# Patient Record
Sex: FEMALE | Race: BLACK OR AFRICAN AMERICAN | NOT HISPANIC OR LATINO | Employment: FULL TIME | URBAN - METROPOLITAN AREA
[De-identification: names, ages, dates, MRNs, and addresses within clinical notes are randomized per-mention and may not be internally consistent; named-entity substitution may affect disease eponyms.]

---

## 2019-11-08 ENCOUNTER — OFFICE VISIT (OUTPATIENT)
Dept: OBSTETRICS AND GYNECOLOGY | Facility: CLINIC | Age: 24
End: 2019-11-08
Payer: MEDICAID

## 2019-11-08 VITALS
DIASTOLIC BLOOD PRESSURE: 74 MMHG | HEIGHT: 67 IN | SYSTOLIC BLOOD PRESSURE: 122 MMHG | WEIGHT: 207.31 LBS | BODY MASS INDEX: 32.54 KG/M2

## 2019-11-08 DIAGNOSIS — Z30.9 ENCOUNTER FOR CONTRACEPTIVE MANAGEMENT, UNSPECIFIED TYPE: ICD-10-CM

## 2019-11-08 DIAGNOSIS — Z11.3 SCREENING EXAMINATION FOR STD (SEXUALLY TRANSMITTED DISEASE): ICD-10-CM

## 2019-11-08 DIAGNOSIS — N89.8 VAGINAL DISCHARGE: ICD-10-CM

## 2019-11-08 DIAGNOSIS — Z01.419 WELL WOMAN EXAM WITH ROUTINE GYNECOLOGICAL EXAM: Primary | ICD-10-CM

## 2019-11-08 DIAGNOSIS — Z72.0 TOBACCO ABUSE: ICD-10-CM

## 2019-11-08 LAB
B-HCG UR QL: NEGATIVE
CTP QC/QA: YES

## 2019-11-08 PROCEDURE — 99999 PR PBB SHADOW E&M-EST. PATIENT-LVL III: CPT | Mod: PBBFAC,,, | Performed by: OBSTETRICS & GYNECOLOGY

## 2019-11-08 PROCEDURE — 99212 OFFICE O/P EST SF 10 MIN: CPT | Mod: 25,S$PBB,, | Performed by: OBSTETRICS & GYNECOLOGY

## 2019-11-08 PROCEDURE — 87801 DETECT AGNT MULT DNA AMPLI: CPT

## 2019-11-08 PROCEDURE — 99385 PREV VISIT NEW AGE 18-39: CPT | Mod: S$PBB,,, | Performed by: OBSTETRICS & GYNECOLOGY

## 2019-11-08 PROCEDURE — 87481 CANDIDA DNA AMP PROBE: CPT | Mod: 59

## 2019-11-08 PROCEDURE — 99385 PR PREVENTIVE VISIT,NEW,18-39: ICD-10-PCS | Mod: S$PBB,,, | Performed by: OBSTETRICS & GYNECOLOGY

## 2019-11-08 PROCEDURE — 81025 URINE PREGNANCY TEST: CPT | Mod: PBBFAC,PN | Performed by: OBSTETRICS & GYNECOLOGY

## 2019-11-08 PROCEDURE — 87491 CHLMYD TRACH DNA AMP PROBE: CPT | Mod: 59

## 2019-11-08 PROCEDURE — 99212 PR OFFICE/OUTPT VISIT, EST, LEVL II, 10-19 MIN: ICD-10-PCS | Mod: 25,S$PBB,, | Performed by: OBSTETRICS & GYNECOLOGY

## 2019-11-08 PROCEDURE — 99213 OFFICE O/P EST LOW 20 MIN: CPT | Mod: PBBFAC,PN | Performed by: OBSTETRICS & GYNECOLOGY

## 2019-11-08 PROCEDURE — 99999 PR PBB SHADOW E&M-EST. PATIENT-LVL III: ICD-10-PCS | Mod: PBBFAC,,, | Performed by: OBSTETRICS & GYNECOLOGY

## 2019-11-08 RX ORDER — ACETAMINOPHEN AND CODEINE PHOSPHATE 120; 12 MG/5ML; MG/5ML
1 SOLUTION ORAL DAILY
Qty: 30 TABLET | Refills: 12 | Status: SHIPPED | OUTPATIENT
Start: 2019-11-08 | End: 2019-12-08

## 2019-11-08 NOTE — PROGRESS NOTES
History & Physical  Gynecology      SUBJECTIVE:     Chief Complaint: Gynecologic Exam; STD CHECK; and Contraception       History of Present Illness:  25 y/o  presents today for contraceptive management in addition to annual visit. She has been on depo provera and OCP's in the past. She is not interested in depo provera due to weight gain, she is currently not using any form of contraception with her male partner.        Review of patient's allergies indicates:  No Known Allergies    History reviewed. No pertinent past medical history.  Past Surgical History:   Procedure Laterality Date     SECTION       OB History        3    Para   3    Term                AB        Living           SAB        TAB        Ectopic        Multiple   1    Live Births                   Family History   Problem Relation Age of Onset    Breast cancer Neg Hx     Colon cancer Neg Hx     Ovarian cancer Neg Hx      Social History     Tobacco Use    Smoking status: Current Every Day Smoker   Substance Use Topics    Alcohol use: Yes    Drug use: Never       Current Outpatient Medications   Medication Sig    norethindrone (MICRONOR) 0.35 mg tablet Take 1 tablet (0.35 mg total) by mouth once daily.     No current facility-administered medications for this visit.          Review of Systems:  Review of Systems   Constitutional: Negative for appetite change, chills, diaphoresis, fatigue and fever.   Respiratory: Negative for cough and shortness of breath.    Cardiovascular: Negative for chest pain and palpitations.   Gastrointestinal: Negative.  Negative for abdominal pain, constipation, diarrhea, nausea and vomiting.   Genitourinary: Negative for decreased libido, dysuria, frequency, menstrual problem, pelvic pain, urgency, vaginal bleeding, vaginal discharge, vaginal pain and vaginal odor.        OBJECTIVE:     Physical Exam:  Physical Exam   Constitutional: She appears well-developed and well-nourished.   Neck:  "No tracheal deviation present. No thyromegaly present.   Cardiovascular: Normal rate and regular rhythm. Exam reveals no gallop and no friction rub.   No murmur heard.  Pulmonary/Chest: Effort normal and breath sounds normal. No stridor. No respiratory distress. She has no wheezes. She has no rales. She exhibits no tenderness.   Abdominal: Soft. Bowel sounds are normal.   Genitourinary: Uterus normal. Vaginal discharge found.   Lymphadenopathy:     She has no cervical adenopathy.         ASSESSMENT:       ICD-10-CM ICD-9-CM    1. Well woman exam with routine gynecological exam Z01.419 V72.31 Vaginosis Screen by DNA Probe      C. trachomatis/N. gonorrhoeae by AMP DNA      RPR      HIV 1/2 Ag/Ab (4th Gen)   2. Tobacco abuse Z72.0 305.1    3. Encounter for contraceptive management, unspecified type Z30.9 V25.9 norethindrone (MICRONOR) 0.35 mg tablet   4. Screening examination for STD (sexually transmitted disease) Z11.3 V74.5 POCT Urine Pregnancy      Vaginosis Screen by DNA Probe      C. trachomatis/N. gonorrhoeae by AMP DNA      RPR      HIV 1/2 Ag/Ab (4th Gen)   5. Vaginal discharge N89.8 623.5           Plan:      1. Encounter for contraceptive management, unspecified type  Education given regarding options for contraception, including barrier methods, injectable contraception, IUD placement, oral contraceptives. Patient states she is not interested in anything that is "placed inside the body", not a candidate for estrogen due to tobacco usage and increased clot risk. I emphasized important of taking progesterone-containing birth control at same time every day for maximum efficacy.    - norethindrone (MICRONOR) 0.35 mg tablet; Take 1 tablet (0.35 mg total) by mouth once daily.  Dispense: 30 tablet; Refill: 12             Counseling time: 30 minutes    UBALDO Du"

## 2019-11-08 NOTE — PROGRESS NOTES
SUBJECTIVE:   24 y.o. female   for annual routine Pap and checkup. Patient's last menstrual period was 10/30/2019..  PMHx significant for the morbidities listed below.  Denies bleeding. Reports intermittent malodorous vaginal discharge. Reports mild stress urinary incontinence.  Denies regular exercise. Reports tobacco usage, has quit before but not ready to quit yet, partner smokes. Is not taking calcium and vitamin D supplements. Is currently sexually active in monogamous relationship with male partner. Not currently using contraception (CLINIC UPT NEGATIVE) . Denies domestic violence.     OB history:  x2, C\S for twins  Fam hx: negative for ovarian, breast, uterine, colon    Pap 1 year ago, WNL  MMG not indicated  Colonoscopy not indicated           History reviewed. No pertinent past medical history.  Past Surgical History:   Procedure Laterality Date     SECTION       Social History     Socioeconomic History    Marital status: Significant Other     Spouse name: Not on file    Number of children: Not on file    Years of education: Not on file    Highest education level: Not on file   Occupational History    Not on file   Social Needs    Financial resource strain: Not on file    Food insecurity:     Worry: Not on file     Inability: Not on file    Transportation needs:     Medical: Not on file     Non-medical: Not on file   Tobacco Use    Smoking status: Current Every Day Smoker   Substance and Sexual Activity    Alcohol use: Yes    Drug use: Never    Sexual activity: Yes     Partners: Male     Birth control/protection: None   Lifestyle    Physical activity:     Days per week: Not on file     Minutes per session: Not on file    Stress: Not on file   Relationships    Social connections:     Talks on phone: Not on file     Gets together: Not on file     Attends Hoahaoism service: Not on file     Active member of club or organization: Not on file     Attends meetings of clubs or  organizations: Not on file     Relationship status: Not on file   Other Topics Concern    Not on file   Social History Narrative    Not on file     Family History   Problem Relation Age of Onset    Breast cancer Neg Hx     Colon cancer Neg Hx     Ovarian cancer Neg Hx      OB History    Para Term  AB Living   3 3           SAB TAB Ectopic Multiple Live Births         1        # Outcome Date GA Lbr Reinaldo/2nd Weight Sex Delivery Anes PTL Lv   3A Para      CS-Unspec      3B Para      CS-Unspec      2 Para      Vag-Spont      1 Para      Vag-Spont            Current Outpatient Medications   Medication Sig Dispense Refill    norethindrone (MICRONOR) 0.35 mg tablet Take 1 tablet (0.35 mg total) by mouth once daily. 30 tablet 12     No current facility-administered medications for this visit.      Allergies: Patient has no known allergies.     ROS:  Constitutional: no weight loss, weight gain, fever, fatigue  Eyes:  No vision changes, glasses/contacts  ENT/Mouth: No ulcers, sinus problems, ears ringing, headache  Cardiovascular: No inability to lie flat, chest pain, exercise intolerance, swelling, heart palpitations  Respiratory: No wheezing, coughing blood, shortness of breath, or cough  Gastrointestinal: No diarrhea, bloody stool, nausea/vomiting, constipation, gas, hemorrhoids  Genitourinary: No blood in urine, painful urination, urgency of urination, frequency of urination, incomplete emptying, incontinence, abnormal bleeding, painful periods, heavy periods, vaginal discharge, vaginal odor, painful intercourse, sexual problems, bleeding after intercourse.  Musculoskeletal: No muscle weakness  Skin/Breast: No painful breasts, nipple discharge, masses, rash, ulcers  Neurological: No passing out, seizures, numbness, headache  Endocrine: No diabetes, hypothyroid, hyperthyroid, hot flashes, hair loss, abnormal hair growth, ance  Psychiatric: No depression, crying  Hematologic: No bruises, bleeding, swollen  "lymph nodes, anemia.      OBJECTIVE:   The patient appears well, alert, oriented x 3, in no distress.  /74   Ht 5' 7" (1.702 m)   Wt 94 kg (207 lb 5.5 oz)   LMP 10/30/2019   BMI 32.47 kg/m²   NECK: negative, no thyromegaly, trachea midline  SKIN: normal and no acne, striae, hirsutism  BREAST EXAM: breasts appear normal, no suspicious masses, no skin or nipple changes or axillary nodes  ABDOMEN: soft, non-tender; bowel sounds normal; no masses,  no organomegaly and no hernias, masses, or hepatosplenomegaly  GENITALIA: normal external genitalia, no erythema, no discharge  URETHRA: normal appearing urethra with no masses, tenderness or lesions  VAGINA: vaginal discharge yellow and malodorous  CERVIX: no lesions or cervical motion tenderness  UTERUS: normal size, contour, position, consistency, mobility, non-tender  ADNEXA: unable to appreciate due to body habitus    \  ASSESSMENT:   SOLANGE was seen today for gynecologic exam, std check and contraception.    Diagnoses and all orders for this visit:    Well woman exam with routine gynecological exam  -     Vaginosis Screen by DNA Probe  -     C. trachomatis/N. gonorrhoeae by AMP DNA  -     RPR; Future  -     HIV 1/2 Ag/Ab (4th Gen); Future    Tobacco abuse  - counseled patient on health benefits of smoking cessation and risks of continued tobacco usage including, HPV infection, stroke, heart disease, lung cancer, and premature death.    Screening examination for STD (sexually transmitted disease)  -     POCT Urine Pregnancy  -     Vaginosis Screen by DNA Probe  -     C. trachomatis/N. gonorrhoeae by AMP DNA  -     RPR; Future  -     HIV 1/2 Ag/Ab (4th Gen); Future    Other orders  -     Cancel: Liquid-based pap smear, screening  -     Cancel: HPV High Risk Genotypes, PCR      "

## 2019-11-09 LAB
BACTERIAL VAGINOSIS DNA: POSITIVE
C TRACH DNA SPEC QL NAA+PROBE: NOT DETECTED
CANDIDA GLABRATA DNA: NEGATIVE
CANDIDA KRUSEI DNA: NEGATIVE
CANDIDA RRNA VAG QL PROBE: NEGATIVE
N GONORRHOEA DNA SPEC QL NAA+PROBE: NOT DETECTED
T VAGINALIS RRNA GENITAL QL PROBE: POSITIVE

## 2019-11-10 ENCOUNTER — PATIENT MESSAGE (OUTPATIENT)
Dept: OBSTETRICS AND GYNECOLOGY | Facility: CLINIC | Age: 24
End: 2019-11-10

## 2019-11-11 ENCOUNTER — TELEPHONE (OUTPATIENT)
Dept: OBSTETRICS AND GYNECOLOGY | Facility: CLINIC | Age: 24
End: 2019-11-11

## 2019-11-11 DIAGNOSIS — N76.0 BACTERIAL VAGINOSIS: Primary | ICD-10-CM

## 2019-11-11 DIAGNOSIS — B96.89 BACTERIAL VAGINOSIS: Primary | ICD-10-CM

## 2019-11-11 DIAGNOSIS — A59.01 TRICHOMONAS VAGINITIS: ICD-10-CM

## 2019-11-11 RX ORDER — METRONIDAZOLE 500 MG/1
500 TABLET ORAL 2 TIMES DAILY
Qty: 14 TABLET | Refills: 0 | Status: SHIPPED | OUTPATIENT
Start: 2019-11-11 | End: 2019-11-18

## 2019-11-11 NOTE — TELEPHONE ENCOUNTER
----- Message from GARRY Du MD sent at 11/11/2019  8:35 AM CST -----  Trichomonas positive, please notify patient, prescription has been sent.

## 2019-11-11 NOTE — TELEPHONE ENCOUNTER
Spoke with pt. Pt notified of results. Medication precaution given. Pt advised to notify all sexual partners in the last 60 days and to abstain from intercourse for 7 days after both her and her partner are treated. All questions answered. Pt verbalized understanding.

## 2019-11-11 NOTE — TELEPHONE ENCOUNTER
----- Message from Nolan Domínguez sent at 11/11/2019  7:50 AM CST -----  Contact: SOLANGE TOBAR   Name of Who is Calling: SOLANGE TOBAR       What is the request in detail: Patient is requesting a call back regarding her STD medication. Please contact to further advise.      Can the clinic reply by [facility]: NO      [facility] [phone]

## 2019-11-11 NOTE — TELEPHONE ENCOUNTER
Spoke with pt. Pt states her partner does not have insurance and would like to know if you can prescribe medication for him or if you know of any free clinics he can go to. Please advise.

## 2019-11-11 NOTE — TELEPHONE ENCOUNTER
No answer. Voicemail box full. Unable to leave message. Will attempt to call again at a later time.       MD Soledad Vieyra LPN   Caller: Unspecified (Today,  9:05 AM)             I can only treat your partner if he had gonorrhea or chlamydia, I unfortunately can't legally prescribe him the treatment for trichomonas.   There is [facility] on [address], the phone number is      [phone]